# Patient Record
Sex: FEMALE | Race: WHITE | Employment: FULL TIME | URBAN - METROPOLITAN AREA
[De-identification: names, ages, dates, MRNs, and addresses within clinical notes are randomized per-mention and may not be internally consistent; named-entity substitution may affect disease eponyms.]

---

## 2022-05-17 RX ORDER — OMEPRAZOLE 10 MG/1
10 CAPSULE, DELAYED RELEASE ORAL
COMMUNITY

## 2022-05-17 RX ORDER — FLUOXETINE 10 MG/1
CAPSULE ORAL
COMMUNITY
Start: 2021-08-04

## 2022-05-17 RX ORDER — CETIRIZINE HYDROCHLORIDE 10 MG/1
10 TABLET ORAL DAILY
COMMUNITY

## 2022-05-17 RX ORDER — AZELASTINE 1 MG/ML
1 SPRAY, METERED NASAL 2 TIMES DAILY PRN
COMMUNITY
Start: 2021-09-11

## 2022-05-17 NOTE — PROGRESS NOTES
[address] [facility] time___0640_________        Surgery time___0810_________    Take the following medications with a sip of water: Follow your MD/Surgeons pre-procedure instructions regarding your medications     Do not eat or drink anything after 12:00 midnight prior to your surgery. This includes water chewing gum, mints and ice chips. You may brush your teeth and gargle the morning of your surgery, but do not swallow the water     Please see your family doctor/pediatrician for a history and physical and/or concerning medications. H&P 4/26/22 Joshua Crys    Bring any test results/reports from your physicians office. If you are under the care of a heart doctor or specialist doctor, please be aware that you may be asked to them for clearance    You may be asked to stop blood thinners such as Coumadin, Plavix, Fragmin, Lovenox, etc., or any anti-inflammatories such as:  Aspirin, Ibuprofen, Advil, Naproxen prior to your surgery. We also ask that you stop any OTC medications such as fish oil, vitamin E, glucosamine, garlic, Multivitamins, COQ 10, etc.    We ask that you do not smoke 24 hours prior to surgery  We ask that you do not  drink any alcoholic beverages 24 hours prior to surgery     You must make arrangements for a responsible adult to take you home after your surgery. For your safety you will not be allowed to leave alone or drive yourself home. Your surgery will be cancelled if you do not have a ride home. Also for your safety, it is strongly suggested that someone stay with you the first 24 hours after your surgery. A parent or legal guardian must accompany a child scheduled for surgery and plan to stay at the hospital until the child is discharged. Please do not bring other children with you. For your comfort, please wear simple loose fitting clothing to the hospital.  Please do not bring valuables.  Wear short sleeve button down shirt or loose shirt and bring eye drops or eye ointment. Do not wear any make-up or nail polish on your fingers or toes      For your safety, please do not wear any jewelry or body piercing's on the day of surgery. All jewelry must be removed. If you have dentures, they will be removed before going to operating room. For your convenience, we will provide you with a container. If you wear contact lenses or glasses, they will be removed, please bring a case for them. If you have a living will and a durable power of  for healthcare, please bring in a copy. As part of our patient safety program to minimize surgical site infections, we ask you to do the following:    · Please notify your surgeon if you develop any illness between         now and the  day of your surgery. · This includes a cough, cold, fever, sore throat, nausea,         or vomiting, and diarrhea, etc.  ·  Please notify your surgeon if you experience dizziness, shortness         of breath or blurred vision between now and the time of your surgery. Do not shave your operative site 96 hours prior to surgery. For face and neck surgery, men may use an electric razor 48 hours   prior to surgery. You may shower the night before surgery or the morning of   your surgery with an antibacterial soap. You will need to bring a photo ID and insurance card    [facility] has an onsite pharmacy, would you like to utilize our pharmacy     If you will be staying overnight and use a C-pap machine, please bring   your C-pap to hospital     Our goal is to provide you with excellent care, therefore, visitors will be limited to two(2) in the room at a time so that we may focus on providing this care for you. Please contact pre-admission testing if you have any further questions.                  [facility] phone number:  [phone]  Please note these are generalized instructions for all surgical cases, you may be provided with more specific instructions according to your surgery. C-Difficile admission screening and protocol:       * Admitted with diarrhea? [] YES    [x]  NO     *Prior history of C-Diff. In last 3 months? [] YES    [x]  NO     *Antibiotic use in the past 6-8 weeks? [x]  NO    []  YES                 If yes, which ANTIBIOTIC AND REASON______     *Prior hospitalization or nursing home in the last month? []  YES    [x]  NO        SAFETY FIRST. .call before you fall no

## 2022-05-20 ENCOUNTER — ANESTHESIA EVENT (OUTPATIENT)
Dept: SURGERY | Age: 57
End: 2022-05-20
Payer: COMMERCIAL

## 2022-05-20 NOTE — PRE-PROCEDURE INSTRUCTIONS
[address]        Pre-Op Phone Call:     Patient Name: Amanda Garcia     Telephone Information:   Mobile [phone]     Home phone:  [phone]    Surgery Time:    8:10 AM     Arrival Time:  6:40 AM     Left extended Message:  yes     Message left with: on voice mail     Recent change in health status:  Yes     Advised of transportation/ policy:  Yes     NPO policy reviewed: Yes     Advised to take morning heart/blood pressure medications with sips of water morning of surgery? Yes     Instructed to bring eye drops, photo identification, and insurance card day of surgery? Yes     Advised to wear short sleeved button down shirt (no T-shirt underneath):  Yes     Advised not to wear jewelry, hairpins, or pantyhose day of surgery? Yes     Advised not to wear make-up and to wash face day of surgery?   Yes    Remarks:  Left above information on home voice mail        Electronically signed by:  Noemí Castillo RN at 5/20/2022 12:03 PM

## 2022-05-23 ENCOUNTER — HOSPITAL ENCOUNTER (OUTPATIENT)
Age: 57
Setting detail: OUTPATIENT SURGERY
Discharge: HOME | End: 2022-05-23
Attending: OPHTHALMOLOGY | Admitting: OPHTHALMOLOGY
Payer: COMMERCIAL

## 2022-05-23 ENCOUNTER — ANESTHESIA (OUTPATIENT)
Dept: SURGERY | Age: 57
End: 2022-05-23
Payer: COMMERCIAL

## 2022-05-23 VITALS
BODY MASS INDEX: 37.89 KG/M2 | SYSTOLIC BLOOD PRESSURE: 116 MMHG | DIASTOLIC BLOOD PRESSURE: 58 MMHG | RESPIRATION RATE: 17 BRPM | WEIGHT: 250 LBS | OXYGEN SATURATION: 98 % | HEIGHT: 68 IN | HEART RATE: 62 BPM | TEMPERATURE: 96.8 F

## 2022-05-23 PROCEDURE — 7100000010 HC PHASE II RECOVERY - FIRST 15 MIN: Performed by: OPHTHALMOLOGY

## 2022-05-23 PROCEDURE — 3700000000 HC ANESTHESIA ATTENDED CARE: Performed by: OPHTHALMOLOGY

## 2022-05-23 PROCEDURE — 2709999900 HC NON-CHARGEABLE SUPPLY: Performed by: OPHTHALMOLOGY

## 2022-05-23 PROCEDURE — 6370000000 HC RX 637 (ALT 250 FOR IP): Performed by: OPHTHALMOLOGY

## 2022-05-23 PROCEDURE — 3700000001 HC ADD 15 MINUTES (ANESTHESIA): Performed by: OPHTHALMOLOGY

## 2022-05-23 PROCEDURE — 6360000002 HC RX W HCPCS: Performed by: NURSE ANESTHETIST, CERTIFIED REGISTERED

## 2022-05-23 PROCEDURE — 2500000003 HC RX 250 WO HCPCS: Performed by: OPHTHALMOLOGY

## 2022-05-23 PROCEDURE — 3600000004 HC SURGERY LEVEL 4 BASE: Performed by: OPHTHALMOLOGY

## 2022-05-23 PROCEDURE — 2580000003 HC RX 258: Performed by: ANESTHESIOLOGY

## 2022-05-23 PROCEDURE — 3600000014 HC SURGERY LEVEL 4 ADDTL 15MIN: Performed by: OPHTHALMOLOGY

## 2022-05-23 PROCEDURE — V2632 POST CHMBR INTRAOCULAR LENS: HCPCS | Performed by: OPHTHALMOLOGY

## 2022-05-23 DEVICE — LENS INTRAOCULAR MONO FOC 24+ DIOPT 6X13 MM ULTRASERT DISP: Type: IMPLANTABLE DEVICE | Status: FUNCTIONAL

## 2022-05-23 RX ORDER — SODIUM CHLORIDE 0.9 % (FLUSH) 0.9 %
5-40 SYRINGE (ML) INJECTION PRN
Status: DISCONTINUED | OUTPATIENT
Start: 2022-05-23 | End: 2022-05-23 | Stop reason: HOSPADM

## 2022-05-23 RX ORDER — BALANCED SALT SOLUTION ENRICHED WITH BICARBONATE, DEXTROSE, AND GLUTATHIONE
KIT INTRAOCULAR
Status: COMPLETED | OUTPATIENT
Start: 2022-05-23 | End: 2022-05-23

## 2022-05-23 RX ORDER — SODIUM CHLORIDE 0.9 % (FLUSH) 0.9 %
5-40 SYRINGE (ML) INJECTION EVERY 12 HOURS SCHEDULED
Status: DISCONTINUED | OUTPATIENT
Start: 2022-05-23 | End: 2022-05-23 | Stop reason: SDUPTHER

## 2022-05-23 RX ORDER — BRIMONIDINE TARTRATE 0.15 %
DROPS OPHTHALMIC (EYE)
Status: COMPLETED | OUTPATIENT
Start: 2022-05-23 | End: 2022-05-23

## 2022-05-23 RX ORDER — PHENYLEPHRINE HCL 2.5 %
1 DROPS OPHTHALMIC (EYE) SEE ADMIN INSTRUCTIONS
Status: DISCONTINUED | OUTPATIENT
Start: 2022-05-23 | End: 2022-05-23 | Stop reason: HOSPADM

## 2022-05-23 RX ORDER — KETOROLAC TROMETHAMINE 5 MG/ML
1 SOLUTION OPHTHALMIC SEE ADMIN INSTRUCTIONS
Status: DISCONTINUED | OUTPATIENT
Start: 2022-05-23 | End: 2022-05-23 | Stop reason: HOSPADM

## 2022-05-23 RX ORDER — CYCLOPENTOLATE HYDROCHLORIDE 10 MG/ML
1 SOLUTION/ DROPS OPHTHALMIC SEE ADMIN INSTRUCTIONS
Status: DISCONTINUED | OUTPATIENT
Start: 2022-05-23 | End: 2022-05-23 | Stop reason: HOSPADM

## 2022-05-23 RX ORDER — LIDOCAINE HYDROCHLORIDE 10 MG/ML
INJECTION, SOLUTION EPIDURAL; INFILTRATION; INTRACAUDAL; PERINEURAL
Status: COMPLETED | OUTPATIENT
Start: 2022-05-23 | End: 2022-05-23

## 2022-05-23 RX ORDER — CIPROFLOXACIN HYDROCHLORIDE 3.5 MG/ML
1 SOLUTION/ DROPS TOPICAL SEE ADMIN INSTRUCTIONS
Status: DISCONTINUED | OUTPATIENT
Start: 2022-05-23 | End: 2022-05-23 | Stop reason: HOSPADM

## 2022-05-23 RX ORDER — TROPICAMIDE 10 MG/ML
1 SOLUTION/ DROPS OPHTHALMIC SEE ADMIN INSTRUCTIONS
Status: DISCONTINUED | OUTPATIENT
Start: 2022-05-23 | End: 2022-05-23 | Stop reason: HOSPADM

## 2022-05-23 RX ORDER — TETRACAINE HYDROCHLORIDE 5 MG/ML
1 SOLUTION OPHTHALMIC ONCE
Status: COMPLETED | OUTPATIENT
Start: 2022-05-23 | End: 2022-05-23

## 2022-05-23 RX ORDER — SODIUM CHLORIDE 9 MG/ML
INJECTION, SOLUTION INTRAVENOUS PRN
Status: DISCONTINUED | OUTPATIENT
Start: 2022-05-23 | End: 2022-05-23 | Stop reason: HOSPADM

## 2022-05-23 RX ORDER — SODIUM CHLORIDE 0.9 % (FLUSH) 0.9 %
5-40 SYRINGE (ML) INJECTION PRN
Status: DISCONTINUED | OUTPATIENT
Start: 2022-05-23 | End: 2022-05-23 | Stop reason: SDUPTHER

## 2022-05-23 RX ORDER — MIDAZOLAM HYDROCHLORIDE 1 MG/ML
INJECTION INTRAMUSCULAR; INTRAVENOUS PRN
Status: DISCONTINUED | OUTPATIENT
Start: 2022-05-23 | End: 2022-05-23 | Stop reason: SDUPTHER

## 2022-05-23 RX ORDER — SODIUM CHLORIDE 0.9 % (FLUSH) 0.9 %
5-40 SYRINGE (ML) INJECTION EVERY 12 HOURS SCHEDULED
Status: DISCONTINUED | OUTPATIENT
Start: 2022-05-23 | End: 2022-05-23 | Stop reason: HOSPADM

## 2022-05-23 RX ORDER — TETRACAINE HYDROCHLORIDE 5 MG/ML
SOLUTION OPHTHALMIC
Status: COMPLETED | OUTPATIENT
Start: 2022-05-23 | End: 2022-05-23

## 2022-05-23 RX ORDER — SODIUM CHLORIDE 9 MG/ML
INJECTION, SOLUTION INTRAVENOUS PRN
Status: DISCONTINUED | OUTPATIENT
Start: 2022-05-23 | End: 2022-05-23 | Stop reason: SDUPTHER

## 2022-05-23 RX ORDER — BALANCED SALT SOLUTION 6.4; .75; .48; .3; 3.9; 1.7 MG/ML; MG/ML; MG/ML; MG/ML; MG/ML; MG/ML
SOLUTION OPHTHALMIC
Status: COMPLETED | OUTPATIENT
Start: 2022-05-23 | End: 2022-05-23

## 2022-05-23 RX ADMIN — LIDOCAINE HYDROCHLORIDE: 35 GEL OPHTHALMIC at 07:55

## 2022-05-23 RX ADMIN — CIPROFLOXACIN 1 DROP: 3 SOLUTION OPHTHALMIC at 07:55

## 2022-05-23 RX ADMIN — KETOROLAC TROMETHAMINE 1 DROP: 0.5 SOLUTION OPHTHALMIC at 07:55

## 2022-05-23 RX ADMIN — CYCLOPENTOLATE HYDROCHLORIDE 1 DROP: 10 SOLUTION/ DROPS OPHTHALMIC at 08:00

## 2022-05-23 RX ADMIN — PHENYLEPHRINE HYDROCHLORIDE 1 DROP: 25 SOLUTION/ DROPS OPHTHALMIC at 08:00

## 2022-05-23 RX ADMIN — TROPICAMIDE 1 DROP: 10 SOLUTION/ DROPS OPHTHALMIC at 07:55

## 2022-05-23 RX ADMIN — MIDAZOLAM 2 MG: 1 INJECTION INTRAMUSCULAR; INTRAVENOUS at 08:23

## 2022-05-23 RX ADMIN — KETOROLAC TROMETHAMINE 1 DROP: 0.5 SOLUTION OPHTHALMIC at 08:00

## 2022-05-23 RX ADMIN — PHENYLEPHRINE HYDROCHLORIDE 1 DROP: 25 SOLUTION/ DROPS OPHTHALMIC at 07:55

## 2022-05-23 RX ADMIN — SODIUM CHLORIDE: 9 INJECTION, SOLUTION INTRAVENOUS at 07:57

## 2022-05-23 RX ADMIN — TETRACAINE HYDROCHLORIDE 1 DROP: 5 SOLUTION OPHTHALMIC at 07:55

## 2022-05-23 RX ADMIN — TROPICAMIDE 1 DROP: 10 SOLUTION/ DROPS OPHTHALMIC at 08:00

## 2022-05-23 RX ADMIN — POVIDONE-IODINE: 5 SOLUTION OPHTHALMIC at 07:55

## 2022-05-23 RX ADMIN — CYCLOPENTOLATE HYDROCHLORIDE 1 DROP: 10 SOLUTION/ DROPS OPHTHALMIC at 07:55

## 2022-05-23 RX ADMIN — CIPROFLOXACIN 1 DROP: 3 SOLUTION OPHTHALMIC at 08:00

## 2022-05-23 ASSESSMENT — PAIN SCALES - GENERAL
PAINLEVEL_OUTOF10: 0
PAINLEVEL_OUTOF10: 0

## 2022-05-23 ASSESSMENT — PAIN - FUNCTIONAL ASSESSMENT: PAIN_FUNCTIONAL_ASSESSMENT: 0-10

## 2022-05-23 ASSESSMENT — LIFESTYLE VARIABLES: SMOKING_STATUS: 0

## 2022-05-23 NOTE — OP NOTE
[address]  [phone]      5/23/2022    Patient name: Angelica Winn  YOB: 1965  MRN: [id number]    Allergies: Allergies   Allergen Reactions    Wheat Bran      Celiac Disease       Pre-operative diagnosis:  Cataract Right Eye    Post-operative diagnosis:  Same    Procedure Performed:  Phacoemulsification with insertion of a foldable posterior chamber intraocular lens implant to the right eye. Anesthesia:  Topical Anesthesia with MAC. Estimated Blood Loss: None    Specimens removed: None    Limbal Relaxing Incisions:  Axis:N/A    Arc Length: N/A    Complications:  None    Description of Procedure: In the same day surgery center, the patient was given the usual pre-operative regimen. In the operating room suite, the right eye was prepped and draped in the usual sterile fashion. A paracentesis tract was fashioned, after which 0.5 MI of 1% preservative free Lidocaine was injected into the anterior chamber followed by Viscoat for a complete chamber fill. A clear cornea temporal tunnel was created using a keratome. Under optimal magnification and visualization, a continuous curvilinear capsulorrhexis was performed. Hydro-dissection of the lens was carried out using balanced salt solution. The lens was then phacoemulsified using the divide and conquer technique. Residual cortex was removed using the I/A handpiece followed by thorough posterior capsule polishing. The capsular bag was then filled with Provisc and the lens was gently delivered into the bag, achieving excellent centration. Provisc was removed using the I/A handpiece and the globe was pressurized using balanced salt solution. The paracentesis tract and the temporal wound were both checked and found to be watertight. The speculum was removed and Betadine 5% was instilled.  The patient tolerated the procedure well and was taken to the same day surgery suite in stable condition. Post-operative instructions and follow-up care were arranged.        Electronically signed by: Cheryl Moyer MD,5/23/2022,10:04 AM

## 2022-05-23 NOTE — H&P
Date of Surgery Update:  Karly Cancer was seen, history and physical examination reviewed, and patient examined by me today. There have been no significant clinical changes since the completion of the previous history and physical. The surgical site was confirmed by the patient and me. I have presented reasonable alternatives to the patient's proposed care, treatment, and services. The discussion I have done encompassed risks, benefits, and side effects related to the alternatives and the risks related to not receiving the proposed care, treatment, and services. All questions answered. Patient wishes to proceed.      Electronically signed by: Jerod Cordero MD,5/23/2022,10:04 AM

## 2022-05-23 NOTE — ANESTHESIA POSTPROCEDURE EVALUATION
Department of Anesthesiology  Postprocedure Note    Patient: Clifton Rodriguez  MRN: [id number]  [place]: 1965  Date of evaluation: 5/23/2022  Time:  8:46 AM     Procedure Summary     Date: 05/23/22 Room / Location: [address]    Anesthesia Start: 7726 Anesthesia Stop: 6032    Procedure: PHACOEMULSIFICATION WITH INTRAOCULAR LENS IMPLANT - RIGHT EYE, ORA (Right ) Diagnosis:       Nuclear sclerotic cataract of right eye      (Nuclear sclerotic cataract of right eye)    Surgeons: Eugene Gold MD Responsible Provider: Natalya Norman MD    Anesthesia Type: MAC ASA Status: 3          Anesthesia Type: MAC    Ehsan Phase I: Ehsan Score: 10    Ehsan Phase II: Ehsan Score: 10    Last vitals: Reviewed and per EMR flowsheets. Anesthesia Post Evaluation    Patient location during evaluation: PACU  Patient participation: complete - patient participated  Level of consciousness: awake and alert  Airway patency: patent  Nausea & Vomiting: no nausea and no vomiting  Complications: no  Cardiovascular status: hemodynamically stable and blood pressure returned to baseline  Respiratory status: spontaneous ventilation, nonlabored ventilation and room air  Hydration status: stable  Comments: Ms. oDrys Mcdaniel was seen comfortably conversing with staff following procedure. Appropriate for discharge home with , who is present at bedside.

## 2022-05-23 NOTE — ANESTHESIA PRE PROCEDURE
Department of Anesthesiology  Preprocedure Note       Name:  Clifton Rodriguez   Age:  61 y.o.  :  1965                                          MRN:  [id number]         Date:  2022      Surgeon: Radha Lou):  Eugene oGld MD    Procedure: Procedure(s):  PHACOEMULSIFICATION WITH INTRAOCULAR LENS IMPLANT - RIGHT EYE, ORA    Medications prior to admission:   Prior to Admission medications    Medication Sig Start Date End Date Taking? Authorizing Provider   FLUoxetine (PROZAC) 10 MG capsule  21  Yes Historical Provider, MD   azelastine (ASTELIN) 0.1 % nasal spray 1 spray by Nasal route 2 times daily as needed  21  Yes Historical Provider, MD   cetirizine (ZYRTEC) 10 MG tablet Take 10 mg by mouth daily   Yes Historical Provider, MD   omeprazole (PRILOSEC) 10 MG delayed release capsule Take 10 mg by mouth    Historical Provider, MD   Probiotic Product (PROBIOTIC-10 PO) Take by mouth daily    Historical Provider, MD       Current medications:    No current facility-administered medications for this encounter. Allergies: Allergies   Allergen Reactions    Wheat Bran      Celiac Disease       Problem List:  There is no problem list on file for this patient.       Past Medical History:        Diagnosis Date    Anxiety 2021    Celiac disease     Fibroids     Seasonal allergies     Sleep apnea 2021    wears CPAP        Past Surgical History:        Procedure Laterality Date    CATARACT REMOVAL WITH IMPLANT Left     CERVICAL FUSION      over 10 yrs ago      SECTION      x 2    CHOLECYSTECTOMY      COLONOSCOPY      ENDOSCOPY, COLON, DIAGNOSTIC      LASIK         Social History:    Social History     Tobacco Use    Smoking status: Former Smoker     Types: Cigarettes    Smokeless tobacco: Never Used    Tobacco comment: through [facility] only    Substance Use Topics    Alcohol use: Yes     Comment: very little  6 times a year Counseling given: Not Answered  Comment: through [facility] only       Vital Signs (Current):   Vitals:    05/17/22 0916   Weight: 230 lb (104.3 kg)   Height: 5' 8\" (1.727 m)                                              BP Readings from Last 3 Encounters:   No data found for BP       NPO Status:                                                                                 BMI:   Wt Readings from Last 3 Encounters:   05/17/22 230 lb (104.3 kg)     Body mass index is 34.97 kg/m². CBC: No results found for: WBC, RBC, HGB, HCT, MCV, RDW, PLT    CMP: No results found for: NA, K, CL, CO2, BUN, CREATININE, GFRAA, AGRATIO, LABGLOM, GLUCOSE, GLU, PROT, CALCIUM, BILITOT, ALKPHOS, AST, ALT    POC Tests: No results for input(s): POCGLU, POCNA, POCK, POCCL, POCBUN, POCHEMO, POCHCT in the last 72 hours.     Coags: No results found for: PROTIME, INR, APTT    HCG (If Applicable): No results found for: PREGTESTUR, PREGSERUM, HCG, HCGQUANT     ABGs: No results found for: PHART, PO2ART, RKT1KOO, VRC5TZV, BEART, S6NHHFZU     Type & Screen (If Applicable):  No results found for: LABABO, LABRH    Drug/Infectious Status (If Applicable):  No results found for: HIV, HEPCAB    COVID-19 Screening (If Applicable): No results found for: COVID19        Anesthesia Evaluation   no history of anesthetic complications:   Airway: Mallampati: II  TM distance: >3 FB   Neck ROM: limited  Comment: s/p cervical fusion  Mouth opening: > = 3 FB   Dental:      Comment: Denies loose teeth    Pulmonary:   (+) sleep apnea: on CPAP,      (-) COPD, asthma, rhonchi, wheezes, rales and not a current smoker (s/p remote cessation)                           Cardiovascular:  Exercise tolerance: good (>4 METS),       (-) hypertension, orthopnea,  SAAB, murmur, weak pulses and peripheral edema      Rhythm: regular  Rate: normal                    Neuro/Psych:   (+) depression/anxiety    (-) seizures, TIA and CVA            ROS comment: Chronic neck pain s/p cervical fusion GI/Hepatic/Renal:   (+) GERD:, morbid obesity     (-) liver disease and no renal disease       Endo/Other:                     Abdominal:       Abdomen: soft. Vascular: Other Findings:           Anesthesia Plan      MAC     ASA 3     (NPO appropriate; denies active nausea / reflux. Cataract on left eye replaced ~6 years ago in [address] [place] [address].)        Anesthetic plan and risks discussed with patient. Plan discussed with CRNA. This pre-anesthesia assessment may be used as a history and physical.    DOS STAFF ADDENDUM:    Pt seen and examined, chart reviewed (including anesthesia, drug and allergy history). No interval changes to history and physical examination. Anesthetic plan, risks, benefits, alternatives, and personnel involved discussed with patient. Patient verbalized an understanding and agrees to proceed.       Kiehsa Jean MD  May 23, 2022  7:22 AM

## 2023-02-22 ENCOUNTER — HOSPITAL ENCOUNTER (OUTPATIENT)
Dept: MAMMOGRAPHY | Age: 58
Discharge: HOME | End: 2023-02-27

## 2023-02-22 DIAGNOSIS — Z12.31 VISIT FOR SCREENING MAMMOGRAM: ICD-10-CM

## 2023-06-21 ENCOUNTER — ANESTHESIA EVENT (OUTPATIENT)
Dept: ENDOSCOPY | Age: 58
End: 2023-06-21
Payer: COMMERCIAL

## 2023-06-22 ENCOUNTER — HOSPITAL ENCOUNTER (OUTPATIENT)
Age: 58
Setting detail: OUTPATIENT SURGERY
Discharge: HOME | End: 2023-06-22
Attending: INTERNAL MEDICINE | Admitting: INTERNAL MEDICINE
Payer: COMMERCIAL

## 2023-06-22 ENCOUNTER — ANESTHESIA (OUTPATIENT)
Dept: ENDOSCOPY | Age: 58
End: 2023-06-22
Payer: COMMERCIAL

## 2023-06-22 VITALS
WEIGHT: 260 LBS | OXYGEN SATURATION: 94 % | HEART RATE: 60 BPM | BODY MASS INDEX: 39.4 KG/M2 | HEIGHT: 68 IN | TEMPERATURE: 97.2 F | DIASTOLIC BLOOD PRESSURE: 61 MMHG | SYSTOLIC BLOOD PRESSURE: 112 MMHG | RESPIRATION RATE: 16 BRPM

## 2023-06-22 DIAGNOSIS — Z12.11 COLON CANCER SCREENING: ICD-10-CM

## 2023-06-22 DIAGNOSIS — K90.0 CELIAC DISEASE: ICD-10-CM

## 2023-06-22 PROCEDURE — 3700000001 HC ADD 15 MINUTES (ANESTHESIA): Performed by: INTERNAL MEDICINE

## 2023-06-22 PROCEDURE — 7100000011 HC PHASE II RECOVERY - ADDTL 15 MIN: Performed by: INTERNAL MEDICINE

## 2023-06-22 PROCEDURE — 3700000000 HC ANESTHESIA ATTENDED CARE: Performed by: INTERNAL MEDICINE

## 2023-06-22 PROCEDURE — 3609010600 HC COLONOSCOPY POLYPECTOMY SNARE/COLD BIOPSY: Performed by: INTERNAL MEDICINE

## 2023-06-22 PROCEDURE — 3609012400 HC EGD TRANSORAL BIOPSY SINGLE/MULTIPLE: Performed by: INTERNAL MEDICINE

## 2023-06-22 PROCEDURE — 2500000003 HC RX 250 WO HCPCS: Performed by: NURSE ANESTHETIST, CERTIFIED REGISTERED

## 2023-06-22 PROCEDURE — 88305 TISSUE EXAM BY PATHOLOGIST: CPT

## 2023-06-22 PROCEDURE — 2580000003 HC RX 258: Performed by: ANESTHESIOLOGY

## 2023-06-22 PROCEDURE — 7100000010 HC PHASE II RECOVERY - FIRST 15 MIN: Performed by: INTERNAL MEDICINE

## 2023-06-22 PROCEDURE — 6360000002 HC RX W HCPCS: Performed by: NURSE ANESTHETIST, CERTIFIED REGISTERED

## 2023-06-22 PROCEDURE — 2709999900 HC NON-CHARGEABLE SUPPLY: Performed by: INTERNAL MEDICINE

## 2023-06-22 RX ORDER — PROPOFOL 10 MG/ML
INJECTION, EMULSION INTRAVENOUS CONTINUOUS PRN
Status: DISCONTINUED | OUTPATIENT
Start: 2023-06-22 | End: 2023-06-22 | Stop reason: SDUPTHER

## 2023-06-22 RX ORDER — PROPOFOL 10 MG/ML
INJECTION, EMULSION INTRAVENOUS PRN
Status: DISCONTINUED | OUTPATIENT
Start: 2023-06-22 | End: 2023-06-22 | Stop reason: SDUPTHER

## 2023-06-22 RX ORDER — SODIUM CHLORIDE, SODIUM LACTATE, POTASSIUM CHLORIDE, CALCIUM CHLORIDE 600; 310; 30; 20 MG/100ML; MG/100ML; MG/100ML; MG/100ML
INJECTION, SOLUTION INTRAVENOUS CONTINUOUS
Status: DISCONTINUED | OUTPATIENT
Start: 2023-06-22 | End: 2023-06-22 | Stop reason: HOSPADM

## 2023-06-22 RX ORDER — LIDOCAINE HYDROCHLORIDE 20 MG/ML
INJECTION, SOLUTION EPIDURAL; INFILTRATION; INTRACAUDAL; PERINEURAL PRN
Status: DISCONTINUED | OUTPATIENT
Start: 2023-06-22 | End: 2023-06-22 | Stop reason: SDUPTHER

## 2023-06-22 RX ORDER — APREMILAST 30 MG/1
TABLET, FILM COATED ORAL
COMMUNITY
Start: 2023-06-20

## 2023-06-22 RX ORDER — GLYCOPYRROLATE 0.2 MG/ML
INJECTION INTRAMUSCULAR; INTRAVENOUS PRN
Status: DISCONTINUED | OUTPATIENT
Start: 2023-06-22 | End: 2023-06-22 | Stop reason: SDUPTHER

## 2023-06-22 RX ADMIN — SODIUM CHLORIDE, POTASSIUM CHLORIDE, SODIUM LACTATE AND CALCIUM CHLORIDE: 600; 310; 30; 20 INJECTION, SOLUTION INTRAVENOUS at 13:17

## 2023-06-22 RX ADMIN — PROPOFOL 200 MCG/KG/MIN: 10 INJECTION, EMULSION INTRAVENOUS at 15:00

## 2023-06-22 RX ADMIN — PROPOFOL 50 MG: 10 INJECTION, EMULSION INTRAVENOUS at 15:03

## 2023-06-22 RX ADMIN — LIDOCAINE HYDROCHLORIDE 100 MG: 20 INJECTION, SOLUTION EPIDURAL; INFILTRATION; INTRACAUDAL; PERINEURAL at 15:00

## 2023-06-22 RX ADMIN — PROPOFOL 50 MG: 10 INJECTION, EMULSION INTRAVENOUS at 15:01

## 2023-06-22 RX ADMIN — PROPOFOL 100 MG: 10 INJECTION, EMULSION INTRAVENOUS at 15:00

## 2023-06-22 RX ADMIN — GLYCOPYRROLATE 0.2 MG: 0.2 INJECTION INTRAMUSCULAR; INTRAVENOUS at 15:35

## 2023-06-22 ASSESSMENT — PAIN - FUNCTIONAL ASSESSMENT: PAIN_FUNCTIONAL_ASSESSMENT: 0-10

## 2023-06-22 ASSESSMENT — PAIN SCALES - GENERAL
PAINLEVEL_OUTOF10: 0
PAINLEVEL_OUTOF10: 0

## 2023-06-22 NOTE — OP NOTE
otherwise normal.     Duodenum:   -Mild erythema in association with patchy mild villous edema seen in the duodenal bulb and proximal second portion. Biopsies were obtained with a cold forceps and sent for histology.  -The examined duodenum was otherwise normal.     Gastric or Duodenal ulcer present: No      Colonoscopy Procedure Details: An informed consent was obtained from the patient after explanation of indications, benefits, possible risks and complications of the procedure. Following completion of the EGD, the patient remained in the left lateral decubitus position. Additional IV sedation was administered as needed to maintain patient comfort. The Olympus colonoscope was inserted through the anus into the rectum. The scope was then carefully advanced through the length of the colon to the cecum. The ileocecal valve and appendiceal orifice was visualized. The quality of preparation was good. Water was used to clear remaining stool from the colon to allow for careful examination of the mucosa with insufflation. The scope was carefully withdrawn for more than 6 minutes. Retroflexion was performed in the rectum. The scope was then straightened, the colon was decompressed, and the scope was withdrawn. Findings:   -Perianal and digital rectal exam: Normal  -Rectum: Normal  -Sigmoid: 4 sessile polyps ranging in size from 3 to 6 mm removed completely with cold snare and sent for histology with ascending polyps. Scattered medium sized diverticula were noted. -Descendin sessile polyps measuring 5 and 6 mm removed completely with cold snare and sent for histology.  -Transverse: 4 sessile polyps ranging in size from 3 to 7 mm removed completely with cold snare and sent for histology.   -Ascending: 3 sessile polyps ranging in size from 3 to 6 mm removed completely with cold snare and sent for histology.  -Cecum: 2 sessile polyps measuring 3 and 4 mm removed completely with cold snare and sent for

## 2023-06-22 NOTE — ANESTHESIA POSTPROCEDURE EVALUATION
Department of Anesthesiology  Postprocedure Note    Patient: Zackary Mulligan  MRN: [id number]  YOB: 1965  Date of evaluation: 6/22/2023      Procedure Summary     Date: 06/22/23 Room / Location: [facility]    Anesthesia Start: 1450 Anesthesia Stop: 1558    Procedures:       COLONOSCOPY POLYPECTOMY SNARE/COLD BIOPSY      EGD BIOPSY Diagnosis:       Colon cancer screening      Celiac disease      (Colon cancer screening [Z12.11])      (Celiac disease [K90.0])    Surgeons: Yaritza Stewart MD Responsible Provider: Cortez Correa MD    Anesthesia Type: MAC ASA Status: 3          Anesthesia Type: No value filed.     Ehsan Phase I: Ehsan Score: 10    Ehsan Phase II: Ehsan Score: 10      Anesthesia Post Evaluation    Patient location during evaluation: PACU  Patient participation: complete - patient participated  Level of consciousness: awake and alert  Airway patency: patent  Nausea & Vomiting: no nausea and no vomiting  Complications: no  Cardiovascular status: hemodynamically stable  Respiratory status: acceptable  Hydration status: euvolemic  Multimodal analgesia pain management approach

## 2023-06-22 NOTE — ANESTHESIA PRE PROCEDURE
Department of Anesthesiology  Preprocedure Note       Name:  Sahna Schrader   Age:  61 y.o.  :  1965                                          MRN:  [id number]         Date:  2023      Surgeon: hPong Carrington):  Melissa Cruz MD    Procedure: Procedure(s):  COLONOSCOPY  ESOPHAGOGASTRODUODENOSCOPY    Medications prior to admission:   Prior to Admission medications    Medication Sig Start Date End Date Taking? Authorizing Provider   OTEZLA 30 MG TABS  23   Historical Provider, MD   omeprazole (PRILOSEC) 10 MG delayed release capsule Take 1 capsule by mouth    Historical Provider, MD   FLUoxetine (PROZAC) 10 MG capsule  21   Historical Provider, MD   azelastine (ASTELIN) 0.1 % nasal spray 1 spray by Nasal route 2 times daily as needed 21   Historical Provider, MD   Probiotic Product (PROBIOTIC-10 PO) Take by mouth daily    Historical Provider, MD   cetirizine (ZYRTEC) 10 MG tablet Take 1 tablet by mouth daily    Historical Provider, MD       Current medications:    Current Facility-Administered Medications   Medication Dose Route Frequency Provider Last Rate Last Admin    lactated ringers IV soln infusion   IntraVENous Continuous Angie Nicolas  mL/hr at 23 1317 New Bag at 23 1317       Allergies: Allergies   Allergen Reactions    Wheat Bran      Celiac Disease       Problem List:  There is no problem list on file for this patient.       Past Medical History:        Diagnosis Date    Anxiety 2021    Celiac disease     Fibroids     Seasonal allergies     Sleep apnea 2021    wears CPAP        Past Surgical History:        Procedure Laterality Date    CATARACT REMOVAL WITH IMPLANT Left     CERVICAL FUSION      over 10 yrs ago      SECTION      x 2    CHOLECYSTECTOMY      COLONOSCOPY      ENDOSCOPY, COLON, DIAGNOSTIC      INTRACAPSULAR CATARACT EXTRACTION Right 2022    PHACOEMULSIFICATION WITH INTRAOCULAR LENS IMPLANT - RIGHT

## 2023-06-22 NOTE — H&P
Gastroenterology Note             Pre-operative History and Physical    Patient: Anne Jones  : 1965  CSN: [id number]    History Obtained From:  Patient and/or guardian. HISTORY OF PRESENT ILLNESS:    Indication: The patient is a 61 y.o. female  here for EGD and colonoscopy. History of celiac disease. Chronic GERD. History of colon polyps and family history of colon cancer. Past Medical History:    Past Medical History:   Diagnosis Date    Anxiety 2021    Celiac disease     Fibroids     Seasonal allergies     Sleep apnea 2021    wears CPAP      Past Surgical History:    Past Surgical History:   Procedure Laterality Date    CATARACT REMOVAL WITH IMPLANT Left     CERVICAL FUSION      over 10 yrs ago      SECTION      x 2    CHOLECYSTECTOMY      COLONOSCOPY      ENDOSCOPY, COLON, DIAGNOSTIC      INTRACAPSULAR CATARACT EXTRACTION Right 2022    PHACOEMULSIFICATION WITH INTRAOCULAR LENS IMPLANT - RIGHT EYE, ORA performed by Tian Hill MD at [address] [place] [address]       Medications Prior to Admission:   No current facility-administered medications on file prior to encounter.      Current Outpatient Medications on File Prior to Encounter   Medication Sig Dispense Refill    OTEZLA 30 MG TABS       omeprazole (PRILOSEC) 10 MG delayed release capsule Take 1 capsule by mouth      FLUoxetine (PROZAC) 10 MG capsule       azelastine (ASTELIN) 0.1 % nasal spray 1 spray by Nasal route 2 times daily as needed      Probiotic Product (PROBIOTIC-10 PO) Take by mouth daily      cetirizine (ZYRTEC) 10 MG tablet Take 1 tablet by mouth daily          Allergies:  Wheat bran      Social History:   Social History     Tobacco Use    Smoking status: Former     Types: Cigarettes    Smokeless tobacco: Never    Tobacco comments:     through [facility] only    Substance Use Topics    Alcohol use: Yes     Comment: very little  6 times a year      Family History:   Family History

## 2023-06-22 NOTE — DISCHARGE INSTRUCTIONS
ENDOSCOPY DISCHARGE INSTRUCTIONS:    Call the physician that did your procedure for any questions or concern:    [facility]: [phone]  DR. KY WING      ACTIVITY:    There are potential side effects to the medications used for sedation and anesthesia during your procedure. These include:  Dizziness or light-headedness, confusion or memory loss, delayed reaction times, loss of coordination, nausea and vomiting. Because of your increased risk for injury, we ask that you observe the following precautions: For the next 24 hours,  DO NOT operate an automobile, bicycle, motorcycle, , power tools or large equipment of any kind. Do not drink alcohol, sign any legal documents or make any legal decisions for 24 hours. Do not bend your head over lower than your heart. DO sit on the side of bed/couch awhile before getting up. Plan on bedrest or quiet relaxation today. You may resume normal activities in 24 hours. DIET:    Your first meal today should be light, avoiding spicy and fatty foods. If you tolerate this first meal, then you may advance to your regular diet unless otherwise advised by your physician. NORMAL SYMPTOMS:  -Mild sore throat if youve had an EGD   -Gaseous discomfort    NOTIFY YOUR PHYSICIAN IF THESE SYMPTOMS OCCUR:  1. Fever (greater than 100)  5. Increased abdominal bloating  2. Severe pain    6. Excessive bleeding  3. Nausea and vomiting  7. Chest pain                                                                    4. Chills    8. Shortness of breath    ADDITIONAL INSTRUCTIONS:    Biopsy results: Call [address],[facility] for biopsy results in 1 week    Educational Information:          Please review these discharge instructions this evening or tomorrow for  information you may have forgotten. We want to thank you for choosing the [facility] as your health care provider. We always strive to provide you with excellent care while you are here.  You may

## 2023-06-22 NOTE — PROGRESS NOTES
Ambulatory Surgery/Procedure Discharge Note    Vitals:    06/22/23 1601   BP: (!) 95/59   Pulse: 59   Resp: 16   Temp: 97.2 °F (36.2 °C)   SpO2: 96%     Pt meets discharge criteria per Ehsan score. In: 900 [I.V.:900]  Out: 0     Restroom use offered before discharge. Yes    Pain assessment:  none  Pain Level: 0    Pt and S.O./family states \"ready to go home\". Pt alert and oriented x4. IV removed. Denies N/V or pain. Discharge instructions given to pt and friend with pt permission. Pt and friend verbalized understanding of all instructions. Left with all belongings, and discharge instructions. Patient discharged to home/self care.  Patient discharged via wheel chair by transporter to waiting family/S.O.       6/22/2023 4:10 PM

## 2024-06-14 ENCOUNTER — ANESTHESIA EVENT (OUTPATIENT)
Dept: ENDOSCOPY | Age: 59
End: 2024-06-14
Payer: COMMERCIAL

## 2024-06-14 ENCOUNTER — ANESTHESIA (OUTPATIENT)
Dept: ENDOSCOPY | Age: 59
End: 2024-06-14
Payer: COMMERCIAL

## 2024-06-14 ENCOUNTER — HOSPITAL ENCOUNTER (OUTPATIENT)
Age: 59
Setting detail: OUTPATIENT SURGERY
Discharge: HOME | End: 2024-06-14
Attending: INTERNAL MEDICINE | Admitting: INTERNAL MEDICINE
Payer: COMMERCIAL

## 2024-06-14 VITALS
BODY MASS INDEX: 53.01 KG/M2 | SYSTOLIC BLOOD PRESSURE: 132 MMHG | HEART RATE: 81 BPM | OXYGEN SATURATION: 95 % | HEIGHT: 60 IN | TEMPERATURE: 97.3 F | WEIGHT: 270 LBS | DIASTOLIC BLOOD PRESSURE: 89 MMHG | RESPIRATION RATE: 18 BRPM

## 2024-06-14 DIAGNOSIS — Z86.010 HISTORY OF COLON POLYPS: ICD-10-CM

## 2024-06-14 PROCEDURE — 6360000002 HC RX W HCPCS: Performed by: NURSE ANESTHETIST, CERTIFIED REGISTERED

## 2024-06-14 PROCEDURE — 2580000003 HC RX 258: Performed by: ANESTHESIOLOGY

## 2024-06-14 PROCEDURE — 3700000000 HC ANESTHESIA ATTENDED CARE: Performed by: INTERNAL MEDICINE

## 2024-06-14 PROCEDURE — 7100000011 HC PHASE II RECOVERY - ADDTL 15 MIN: Performed by: INTERNAL MEDICINE

## 2024-06-14 PROCEDURE — 3700000001 HC ADD 15 MINUTES (ANESTHESIA): Performed by: INTERNAL MEDICINE

## 2024-06-14 PROCEDURE — 3609010600 HC COLONOSCOPY POLYPECTOMY SNARE/COLD BIOPSY: Performed by: INTERNAL MEDICINE

## 2024-06-14 PROCEDURE — 88305 TISSUE EXAM BY PATHOLOGIST: CPT

## 2024-06-14 PROCEDURE — 2709999900 HC NON-CHARGEABLE SUPPLY: Performed by: INTERNAL MEDICINE

## 2024-06-14 PROCEDURE — 7100000010 HC PHASE II RECOVERY - FIRST 15 MIN: Performed by: INTERNAL MEDICINE

## 2024-06-14 PROCEDURE — 2500000003 HC RX 250 WO HCPCS: Performed by: NURSE ANESTHETIST, CERTIFIED REGISTERED

## 2024-06-14 PROCEDURE — 2580000003 HC RX 258: Performed by: NURSE ANESTHETIST, CERTIFIED REGISTERED

## 2024-06-14 RX ORDER — LIDOCAINE HYDROCHLORIDE 20 MG/ML
INJECTION, SOLUTION INFILTRATION; PERINEURAL PRN
Status: DISCONTINUED | OUTPATIENT
Start: 2024-06-14 | End: 2024-06-14 | Stop reason: SDUPTHER

## 2024-06-14 RX ORDER — GLYCOPYRROLATE 0.2 MG/ML
INJECTION INTRAMUSCULAR; INTRAVENOUS PRN
Status: DISCONTINUED | OUTPATIENT
Start: 2024-06-14 | End: 2024-06-14 | Stop reason: SDUPTHER

## 2024-06-14 RX ORDER — SODIUM CHLORIDE, SODIUM LACTATE, POTASSIUM CHLORIDE, CALCIUM CHLORIDE 600; 310; 30; 20 MG/100ML; MG/100ML; MG/100ML; MG/100ML
INJECTION, SOLUTION INTRAVENOUS CONTINUOUS
Status: DISCONTINUED | OUTPATIENT
Start: 2024-06-14 | End: 2024-06-14 | Stop reason: HOSPADM

## 2024-06-14 RX ORDER — PROPOFOL 10 MG/ML
INJECTION, EMULSION INTRAVENOUS PRN
Status: DISCONTINUED | OUTPATIENT
Start: 2024-06-14 | End: 2024-06-14 | Stop reason: SDUPTHER

## 2024-06-14 RX ORDER — SODIUM CHLORIDE, SODIUM LACTATE, POTASSIUM CHLORIDE, CALCIUM CHLORIDE 600; 310; 30; 20 MG/100ML; MG/100ML; MG/100ML; MG/100ML
INJECTION, SOLUTION INTRAVENOUS CONTINUOUS PRN
Status: DISCONTINUED | OUTPATIENT
Start: 2024-06-14 | End: 2024-06-14 | Stop reason: SDUPTHER

## 2024-06-14 RX ADMIN — PROPOFOL 50 MG: 10 INJECTION, EMULSION INTRAVENOUS at 14:45

## 2024-06-14 RX ADMIN — PROPOFOL 100 MG: 10 INJECTION, EMULSION INTRAVENOUS at 14:42

## 2024-06-14 RX ADMIN — GLYCOPYRROLATE 0.2 MG: 0.2 INJECTION INTRAMUSCULAR; INTRAVENOUS at 14:42

## 2024-06-14 RX ADMIN — PROPOFOL 50 MG: 10 INJECTION, EMULSION INTRAVENOUS at 14:51

## 2024-06-14 RX ADMIN — PROPOFOL 50 MG: 10 INJECTION, EMULSION INTRAVENOUS at 15:12

## 2024-06-14 RX ADMIN — SODIUM CHLORIDE, POTASSIUM CHLORIDE, SODIUM LACTATE AND CALCIUM CHLORIDE: 600; 310; 30; 20 INJECTION, SOLUTION INTRAVENOUS at 13:37

## 2024-06-14 RX ADMIN — PROPOFOL 50 MG: 10 INJECTION, EMULSION INTRAVENOUS at 14:48

## 2024-06-14 RX ADMIN — PROPOFOL 50 MG: 10 INJECTION, EMULSION INTRAVENOUS at 15:02

## 2024-06-14 RX ADMIN — LIDOCAINE HYDROCHLORIDE 100 MG: 20 INJECTION, SOLUTION INFILTRATION; PERINEURAL at 14:42

## 2024-06-14 RX ADMIN — PROPOFOL 50 MG: 10 INJECTION, EMULSION INTRAVENOUS at 14:58

## 2024-06-14 RX ADMIN — PROPOFOL 50 MG: 10 INJECTION, EMULSION INTRAVENOUS at 15:15

## 2024-06-14 RX ADMIN — PROPOFOL 50 MG: 10 INJECTION, EMULSION INTRAVENOUS at 14:54

## 2024-06-14 RX ADMIN — SODIUM CHLORIDE, SODIUM LACTATE, POTASSIUM CHLORIDE, AND CALCIUM CHLORIDE: .6; .31; .03; .02 INJECTION, SOLUTION INTRAVENOUS at 14:27

## 2024-06-14 RX ADMIN — GLYCOPYRROLATE 0.2 MG: 0.2 INJECTION INTRAMUSCULAR; INTRAVENOUS at 15:01

## 2024-06-14 RX ADMIN — PROPOFOL 50 MG: 10 INJECTION, EMULSION INTRAVENOUS at 15:07

## 2024-06-14 ASSESSMENT — PAIN - FUNCTIONAL ASSESSMENT
PAIN_FUNCTIONAL_ASSESSMENT: 0-10

## 2024-06-14 NOTE — PROGRESS NOTES
Ambulatory Surgery/Procedure Discharge Note    Vitals:    06/14/24 1543   BP: 132/89   Pulse: 81   Resp: 18   Temp:    SpO2: 95%       In: 500 [I.V.:500]  Out: -     Restroom use offered before discharge.  Yes    Pain assessment:  level of pain (1-10, 10 severe)  Pain Level: 0  Pt to Endoscopy recovery post Colonoscopy. Pt denies pain at this time. Pt denies nausea at this time, pt tolerating PO fluids well.   Discharge instructions given to pt and pt's friend and both state understanding of these instructions. Pt states that she is \"ready to go.\"         Patient discharged to home/self care. Patient discharged via wheel chair by transporter to waiting family/S.O.       6/14/2024 3:59 PM

## 2024-06-14 NOTE — DISCHARGE INSTRUCTIONS
ENDOSCOPY DISCHARGE INSTRUCTIONS:    Call the physician that did your procedure for any questions or concern:    [facility]: [phone]  DR. KY WING      ACTIVITY:    There are potential side effects to the medications used for sedation and anesthesia during your procedure.  These include:  Dizziness or light-headedness, confusion or memory loss, delayed reaction times, loss of coordination, nausea and vomiting.  Because of your increased risk for injury, we ask that you observe the following precautions:  For the next 24 hours,  DO NOT operate an automobile, bicycle, motorcycle, , power tools or large equipment of any kind.  Do not drink alcohol, sign any legal documents or make any legal decisions for 24 hours.  Do not bend your head over lower than your heart.  DO sit on the side of bed/couch awhile before getting up.  Plan on bedrest or quiet relaxation today.  You may resume normal activities in 24 hours.    DIET:    Your first meal today should be light, avoiding spicy and fatty foods.  If you tolerate this first meal, then you may advance to your regular diet unless otherwise advised by your physician.    NORMAL SYMPTOMS:  -Mild sore throat if you’ve had an EGD   -Gaseous discomfort    NOTIFY YOUR PHYSICIAN IF THESE SYMPTOMS OCCUR:  1. Fever (greater than 100)  5. Increased abdominal bloating  2. Severe pain    6. Excessive bleeding  3. Nausea and vomiting  7. Chest pain                                                                    4. Chills    8. Shortness of breath    ADDITIONAL INSTRUCTIONS:    Biopsy results: Call [facility] for biopsy results in 1 week    Educational Information:  Impression:    -Diverticulosis   -Colonic polyps x3     Recommendations:    -Await pathology results   -Repeat colonoscopy in 2 years   -25 grams dietary fiber daily recommended for diverticulosis.            Please review these discharge instructions this evening or tomorrow for  information you  help you get to and stay at a healthy weight. Try to exercise on most days of the week. Walking is a good choice.  Eat well. Choose a variety of vegetables, fruits, legumes (such as peas and beans), fish, poultry, and whole grains.  Do not smoke. If you need help quitting, talk to your doctor about stop-smoking programs and medicines. These can increase your chances of quitting for good.  If you drink alcohol, limit how much you drink. Limit alcohol to 2 drinks a day for men and 1 drink a day for women.  When should you call for help?   Call your doctor now or seek immediate medical care if:    You have severe belly pain.     Your stools are maroon or very bloody.   Watch closely for changes in your health, and be sure to contact your doctor if:    You have a fever.     You have nausea or vomiting.     You have a change in bowel habits (new constipation or diarrhea).     Your symptoms get worse or are not improving as expected.

## 2024-06-14 NOTE — ANESTHESIA PRE PROCEDURE
Department of Anesthesiology  Preprocedure Note       Name:  Esther Pedro   Age:  58 y.o.  :  1965                                          MRN:  [id number]         Date:  2024      Surgeon: Surgeon(s):  Rod Marcelino MD    Procedure: Procedure(s):  COLONOSCOPY    Medications prior to admission:   Prior to Admission medications    Medication Sig Start Date End Date Taking? Authorizing Provider   Risankizumab-rzaa (SKYRIZI PEN SC) Inject into the skin   Yes Monica Nava MD   omeprazole (PRILOSEC) 10 MG delayed release capsule Take 1 capsule by mouth    Monica Nava MD   FLUoxetine (PROZAC) 10 MG capsule  21   Monica Nava MD   azelastine (ASTELIN) 0.1 % nasal spray 1 spray by Nasal route 2 times daily as needed  Patient not taking: Reported on 2024   Monica Nava MD   Probiotic Product (PROBIOTIC-10 PO) Take by mouth daily  Patient not taking: Reported on 2024    Monica Nava MD   cetirizine (ZYRTEC) 10 MG tablet Take 1 tablet by mouth daily  Patient not taking: Reported on 2024    Monica Nava MD       Current medications:    Current Facility-Administered Medications   Medication Dose Route Frequency Provider Last Rate Last Admin    lactated ringers IV soln infusion   IntraVENous Continuous Eliot Schrader MD           Allergies:    Allergies   Allergen Reactions    Wheat      Celiac Disease       Problem List:  There is no problem list on file for this patient.      Past Medical History:        Diagnosis Date    Anxiety 2021    Celiac disease     Fibroids     Seasonal allergies     Sleep apnea 2021    wears CPAP        Past Surgical History:        Procedure Laterality Date    CATARACT REMOVAL WITH IMPLANT Left     CERVICAL FUSION      over 10 yrs ago      SECTION      x 2    CHOLECYSTECTOMY      COLONOSCOPY      COLONOSCOPY N/A 2023    COLONOSCOPY POLYPECTOMY SNARE/COLD BIOPSY

## 2024-06-14 NOTE — OP NOTE
Colonoscopy Procedure  Note          Patient: Esther Pedro  : 1965  CSN: [id number]    Procedure: Colonoscopy with polypectomy (cold snare)    Date:  2024    Surgeon:  Rod Marcelino MD    Referring Physician:   Elsie Obando MD    Preoperative Diagnosis: Personal history of colonic polyps, family history of colon cancer    Postoperative Diagnosis: Per impressions below    Anesthesia:  MAC per anesthesia record.      EBL: <5 mL    Indications: This is a 58 y.o. year old female who presents today with family history of colon cancer.  Personal history of multiple adenomatous polyps on colonoscopy .    Procedure:   An informed consent was obtained from the patient after explanation of indications, benefits, possible risks and complications of the procedure.  The patient was then taken to the endoscopy suite, placed in the left lateral decubitus position, and the above IV anesthesia was administered.    The Olympus colonoscope was inserted through the anus into the rectum. The scope was then carefully advanced through the length of the colon to the cecum. The ileocecal valve and appendiceal orifice was visualized. The quality of preparation was good. Water was used to clear remaining stool from the colon to allow for careful examination of the mucosa with insufflation. The scope was carefully withdrawn for more than 6 minutes. Retroflexion was performed in the rectum.  The scope was then straightened, the colon was decompressed, and the scope was withdrawn.     Findings:   -Perianal and digital rectal exam: Normal  -Rectum: Normal   -Sigmoid: Two sessile polyps measuring 4mm and 5mm removed with cold snare and sent for histology. Scattered diverticula   -Descending: Normal  -Transverse: 3mm sessile polyp removed with cold snare and sent for histology.    -Ascending: Normal   -Cecum: Normal   -Frequent spastic contractions were noted.  Treated with Robinul.        The patient  tolerated the procedure well and was taken to the PACU in good condition.    Complications: No immediate complications.     Impression:    -Diverticulosis   -Colonic polyps x3    Recommendations:    -Await pathology results   -Repeat colonoscopy in 2 years   -25 grams dietary fiber daily recommended for diverticulosis.     Rod Marcelino MD  [facility]  6/14/2024

## 2024-06-14 NOTE — H&P
Gastroenterology Note             Pre-operative History and Physical    Patient: Esther Pedro  : 1965  CSN: [id number]    History Obtained From:  Patient and/or guardian.     HISTORY OF PRESENT ILLNESS:    Indication: The patient is a 58 y.o. female  here for colonoscopy.  Personal history of multiple adenomatous colonic polyps seen on colonoscopy .  Family history of colon cancer in her father.    Past Medical History:    Past Medical History:   Diagnosis Date    Anxiety 2021    Celiac disease     Fibroids     Seasonal allergies     Sleep apnea 2021    wears CPAP      Past Surgical History:    Past Surgical History:   Procedure Laterality Date    CATARACT REMOVAL WITH IMPLANT Left     CERVICAL FUSION      over 10 yrs ago      SECTION      x 2    CHOLECYSTECTOMY      COLONOSCOPY      COLONOSCOPY N/A 2023    COLONOSCOPY POLYPECTOMY SNARE/COLD BIOPSY performed by Rod Marcelino MD at [facility] ENDOSCOPY    ENDOSCOPY, COLON, DIAGNOSTIC      INTRACAPSULAR CATARACT EXTRACTION Right 2022    PHACOEMULSIFICATION WITH INTRAOCULAR LENS IMPLANT - RIGHT EYE, ORA performed by Beny Ruiz MD at [facility]    LASIK      UPPER GASTROINTESTINAL ENDOSCOPY N/A 2023    EGD BIOPSY performed by Rod Marcelino MD at [facility] ENDOSCOPY     Medications Prior to Admission:   No current facility-administered medications on file prior to encounter.     Current Outpatient Medications on File Prior to Encounter   Medication Sig Dispense Refill    Risankizumab-rzaa (SKYRIZI PEN SC) Inject into the skin      omeprazole (PRILOSEC) 10 MG delayed release capsule Take 1 capsule by mouth      FLUoxetine (PROZAC) 10 MG capsule       azelastine (ASTELIN) 0.1 % nasal spray 1 spray by Nasal route 2 times daily as needed (Patient not taking: Reported on 2024)      Probiotic Product (PROBIOTIC-10 PO) Take by mouth daily (Patient not taking: Reported on 2024)

## 2024-06-14 NOTE — ANESTHESIA POSTPROCEDURE EVALUATION
Department of Anesthesiology  Postprocedure Note    Patient: Esther Pedro  MRN: [id number]  YOB: 1965  Date of evaluation: 6/14/2024    Procedure Summary       Date: 06/14/24 Room / Location: [place] [address] / [facility]    Anesthesia Start: 1432 Anesthesia Stop: 1521    Procedure: COLONOSCOPY POLYPECTOMY SNARE/BIOPSY Diagnosis:       History of colon polyps      (History of colon polyps [Z86.010])    Surgeons: Rod Marcelino MD Responsible Provider: Josesito Ramirez MD    Anesthesia Type: MAC ASA Status: 3            Anesthesia Type: No value filed.    Ehsan Phase I: Ehsan Score: 10    Ehsan Phase II: Ehsan Score: 7    Anesthesia Post Evaluation    Patient location during evaluation: PACU  Patient participation: complete - patient participated  Level of consciousness: awake and alert  Airway patency: patent  Nausea & Vomiting: no nausea and no vomiting  Cardiovascular status: hemodynamically stable  Respiratory status: acceptable  Hydration status: euvolemic  Multimodal analgesia pain management approach  Pain management: satisfactory to patient    No notable events documented.

## (undated) DEVICE — GLOVE SURG SZ 85 CRM LTX FREE POLYISOPRENE POLYMER BEAD ANTI

## (undated) DEVICE — SNARE COLD DIAMOND 10MM THIN

## (undated) DEVICE — SYRINGE MED 3ML CLR PLAS STD N CTRL LUERLOCK TIP DISP

## (undated) DEVICE — SYRINGE TB 1ML NDL 25GA L0.625IN PLAS SLIP TIP CONVENTIONAL

## (undated) DEVICE — SOLUTION IRRIG 500ML STRL H2O NONPYROGENIC

## (undated) DEVICE — SOLUTION IRRIG BSS ST 500ML

## (undated) DEVICE — TRAP SPEC RETRV CLR PLAS POLYP IN LN SUCT QUIK CTCH

## (undated) DEVICE — Device: Brand: MEDEX

## (undated) DEVICE — SURGICAL PROCEDURE PACK PIK PPK374205] ALCON LABORATORIES INC]

## (undated) DEVICE — SURGICAL PROC PACK SHT WEST V4

## (undated) DEVICE — Z DISCONTINUED USE 2131664 WIPE INSTR W3XL3IN NONLINTING

## (undated) DEVICE — SYRINGE MED 30ML STD CLR PLAS LUERLOCK TIP N CTRL DISP

## (undated) DEVICE — FORCEPS BX L240CM JAW DIA2.8MM L CAP W/ NDL MIC MESH TOOTH

## (undated) DEVICE — CANNULA SAMP CO2 AD GRN 7FT CO2 AND 7FT O2 TBNG UNIV CONN